# Patient Record
Sex: MALE | Race: BLACK OR AFRICAN AMERICAN | NOT HISPANIC OR LATINO | Employment: PART TIME | URBAN - METROPOLITAN AREA
[De-identification: names, ages, dates, MRNs, and addresses within clinical notes are randomized per-mention and may not be internally consistent; named-entity substitution may affect disease eponyms.]

---

## 2023-04-12 ENCOUNTER — OFFICE VISIT (OUTPATIENT)
Dept: PRIMARY CARE | Facility: CLINIC | Age: 23
End: 2023-04-12
Payer: COMMERCIAL

## 2023-04-12 VITALS
DIASTOLIC BLOOD PRESSURE: 76 MMHG | BODY MASS INDEX: 22.8 KG/M2 | SYSTOLIC BLOOD PRESSURE: 133 MMHG | WEIGHT: 168.3 LBS | OXYGEN SATURATION: 100 % | HEART RATE: 84 BPM | HEIGHT: 72 IN | TEMPERATURE: 98.1 F

## 2023-04-12 DIAGNOSIS — Z00.00 HEALTH CARE MAINTENANCE: Primary | ICD-10-CM

## 2023-04-12 PROCEDURE — 99203 OFFICE O/P NEW LOW 30 MIN: CPT

## 2023-04-12 PROCEDURE — 1036F TOBACCO NON-USER: CPT

## 2023-04-12 ASSESSMENT — PAIN SCALES - GENERAL: PAINLEVEL: 0-NO PAIN

## 2023-04-12 NOTE — PATIENT INSTRUCTIONS
It was so great to see you today Fraknlin Tao  . Today we discussed:    -blood drawn at [facility] lab  - See you in 1yr or sooner if needed    Call [phone]  For Care if you need to schedule appointment with specialist or images.    Follow up in       You were see by:    Dr. Grace Up D.O.  [facility]   Phone: [phone]

## 2023-04-12 NOTE — PROGRESS NOTES
"Subjective   Patient ID: Franklin Tao is a 22 y.o. male who presents for Annual Exam.    Concerns: none    PMH: none  PSH: none  Medications: none  Allergies: none  Fhx: Mother-Healthy Maternal Grandmother-health Father-unknown    SoHx:  -Living Situation: lives with mother and grandmother  -School/Employment: Works at [employer] part-time  -Substance: no T/A/RD use  -Abuse: denies  Hobbies: Video games    Preventative:  -Last AAA U/S: n/a  -Last Colonoscopy (50-75): n/a  -Last LDCT (55-80): n/a  -Last Dental: recommended follow up  -Last Eye exam: recommended follow up  -Last DEXA: n/a  -Exercise: Patient doesn't have any specific physical activity/regimen. He does walk/play with dog on regular basis. Has a pit bull. States he was jumping a trampoline today  -Diet: Endorses eating fruits & vegetables. Mostly home cooked meals by mother.  -Seat Belt Use: always    Immunizations:  -Flu vaccine: n/a  -Pneuomvax (PPSV23): n/a  -Prevnar (PCV13): not indicated  -HPV: need to request records  -Tdap: need to request records  -Shingrix(50+): not indicated    Screenings:  -Hep C (9863-1826): due (all adults >18yrs)  -HIV(15-65): due  -GC/CT: none  -Syphilis: none  -Lipid Panel (35M,45F): none  -DM screening: not indicated  -HTN screening: /76  -Vitamin D 25-OH: none  -Depression: PHQ-2 neg  -CAGE: neg  -PHQ-2: neg    REVIEW OF SYSTEMS:  -No fevers, chills, weight is stable  -No sores, ulcers, rashes, skin lesions  -No HA, SZ, syncope, stroke, TIA  -No CP, chest pressure  -No cough, SOB  -No ABD pain, nausea, vomiting  -No urinary urgency, dysuria, urinary frequency  -No BRBPR, melena, hematuria  -No bleeding  -No edema, no calf pain  -Organ systems reviewed & negative, except as mentioned in HPI            Review of Systems    Objective   /76 (BP Location: Left arm, Patient Position: Sitting, BP Cuff Size: Adult)   Pulse 84   Temp 36.7 °C (98.1 °F) (Temporal)   Ht 1.83 m (6' 0.05\")   Wt 76.3 kg (168 lb 4.8 oz)  "  SpO2 100%   BMI 22.80 kg/m²      PHYSICAL EXAM:  -General:  Well developed. Alert. No acute distress.  -Skin:  Warm, dry. normal skin turgor. no rashes. no lesions.  -HEENT:  NCAT. moist mucosa, no erythema. EOMI. PERRLA. Ear canal patent with TMs intact bilaterally. no abnormality on thyroid exam.   -Cardiovascular:  Regular rate and rhythm, normal S1 and S2, no gallops, no murmurs and no pericardial rub.  -Pulmonary:  Clear bilateral breath sounds. no crackles, rales, rhonchi. good chest rise B/L. speaks in full sentences.  -Abdomen:  (+) BS. soft. non-tender. non-distended. no abdominal masses. no guarding or rigidity.  -Neurologic:  CN 2-12 grossly intact. Sensation equal b/l and grossly intact.  -Musculoskeletal:  Normal gait. Normal Stance. full range of motion in all extremities. Strength 5/5 in all extremities.  -Psychiatric:  Good judgment. Good insight. Alert and oriented x3 (place, person, time). Normal recent memory. normal remote memory. Normal mood. Normal affect.  -: deferred    Assessment/Plan        22 year old M here for [facility]. patient overall doing well with no concerns. He wants to establish care with PCP and obtain a physical exam.     PLAN:  #RHM  -Nutrition: Limit junk food. Make sure you have enough calcium in your diet, and if not start a daily multivitamin.   -Exercise: Do some type of physical activity at least 30-60 minutes daily. ~  -Dental: recommend brushing at least twice daily, flossing daily, and visiting a dentist every 6 months.  -Safety: Always wear a seatbelt and avoid distractions while driving (ex. texting).   -Ordered HIV and HCV antibody screen  -Encouraged to download [facility]ney for [facility]      RTC in 1yr for Adult Well check or sooner if needed      Discussed with attending, Dr. Archie Up D.O.  Family Medicine, PGY 1

## 2023-04-18 NOTE — PROGRESS NOTES
I reviewed with the resident the medical history and the resident’s findings on physical examination.  I discussed with the resident the patient’s diagnosis and concur with the treatment plan as documented in the resident note.     Jayda Almanza MD

## 2024-03-12 ENCOUNTER — HOSPITAL ENCOUNTER (EMERGENCY)
Facility: HOSPITAL | Age: 24
Discharge: HOME | End: 2024-03-12

## 2024-03-12 VITALS
SYSTOLIC BLOOD PRESSURE: 116 MMHG | HEIGHT: 73 IN | BODY MASS INDEX: 23.86 KG/M2 | OXYGEN SATURATION: 97 % | RESPIRATION RATE: 18 BRPM | WEIGHT: 180 LBS | HEART RATE: 97 BPM | TEMPERATURE: 98.3 F | DIASTOLIC BLOOD PRESSURE: 68 MMHG

## 2024-03-12 DIAGNOSIS — J10.1 INFLUENZA B: Primary | ICD-10-CM

## 2024-03-12 LAB
FLUAV RNA RESP QL NAA+PROBE: NOT DETECTED
FLUBV RNA RESP QL NAA+PROBE: DETECTED
S PYO DNA THROAT QL NAA+PROBE: NOT DETECTED
SARS-COV-2 RNA RESP QL NAA+PROBE: NOT DETECTED

## 2024-03-12 PROCEDURE — 2500000001 HC RX 250 WO HCPCS SELF ADMINISTERED DRUGS (ALT 637 FOR MEDICARE OP): Performed by: PHYSICIAN ASSISTANT

## 2024-03-12 PROCEDURE — 87651 STREP A DNA AMP PROBE: CPT | Performed by: PHYSICIAN ASSISTANT

## 2024-03-12 PROCEDURE — 87636 SARSCOV2 & INF A&B AMP PRB: CPT | Performed by: EMERGENCY MEDICINE

## 2024-03-12 PROCEDURE — 99283 EMERGENCY DEPT VISIT LOW MDM: CPT

## 2024-03-12 RX ORDER — OSELTAMIVIR PHOSPHATE 75 MG/1
75 CAPSULE ORAL EVERY 12 HOURS
Qty: 10 CAPSULE | Refills: 0 | Status: SHIPPED | OUTPATIENT
Start: 2024-03-12 | End: 2024-03-17

## 2024-03-12 RX ORDER — IBUPROFEN 600 MG/1
600 TABLET ORAL ONCE
Status: COMPLETED | OUTPATIENT
Start: 2024-03-12 | End: 2024-03-12

## 2024-03-12 RX ADMIN — IBUPROFEN 600 MG: 600 TABLET, FILM COATED ORAL at 09:45

## 2024-03-12 ASSESSMENT — PAIN SCALES - GENERAL: PAINLEVEL_OUTOF10: 0 - NO PAIN

## 2024-03-12 ASSESSMENT — COLUMBIA-SUICIDE SEVERITY RATING SCALE - C-SSRS
1. IN THE PAST MONTH, HAVE YOU WISHED YOU WERE DEAD OR WISHED YOU COULD GO TO SLEEP AND NOT WAKE UP?: NO
2. HAVE YOU ACTUALLY HAD ANY THOUGHTS OF KILLING YOURSELF?: NO
6. HAVE YOU EVER DONE ANYTHING, STARTED TO DO ANYTHING, OR PREPARED TO DO ANYTHING TO END YOUR LIFE?: NO

## 2024-03-12 ASSESSMENT — PAIN - FUNCTIONAL ASSESSMENT: PAIN_FUNCTIONAL_ASSESSMENT: 0-10

## 2024-03-12 NOTE — ED PROVIDER NOTES
HPI   Chief Complaint   Patient presents with    Flu Symptoms       History of present illness: 23-year-old male complains of cough fever and chills for the past 3 days.  Says the family has recently been ill with positive influenza.  Cough is mostly unproductive.  Myalgias are moderate.  Denies any chest pain shortness of breath lightheadedness dizziness nausea vomiting diarrhea constipation dysuria polyuria.  Has not taken any medicine today.    Review of systems: Constitutional, eye, ENT, cardiovascular, respiratory, gastrointestinal, genitourinary, neurologic, musculoskeletal, dermatologic, hematologic, endocrine systems were evaluated and were negative unless otherwise specified in history of present illness.    Medications: Reviewed and per nursing note.    Family history: Denies relevant medical conditions.    Social history: Denies tobacco, alcohol, drug use.      Physical exam:    Appearance: Well-developed, well-nourished, nontoxic-appearing, alert and oriented x3. Talking in complete sentences.    HEENT:  Head normocephalic atraumatic, extraocular movements intact, pupils equal round reactive to light, mucous membranes are moist and pink.    NECK:  Nml Inspection, no meningismus, no thyromegaly, no lymphadenopathy, no JVD, trachea is midline.    Respiratory: Clear to auscultation bilaterally with normal bilateral excursion. No wheezes, rhonchi, crackles.    Cardiovascular: Regular rate and rhythm, no murmurs, rubs or gallops. Pulses 2+ symmetrically in the dorsalis pedis and radial pulses.    Abdomen/GI:  Soft, nontender, nondistended, normal bowel sounds x4. No masses or organomegaly.    :  No CVA tenderness    Neuro:  Oriented x 3, Speech Clear, cranial nerves grossly intact. Normal sensation to light touch in all 4 extremities.    Musculoskeletal: Patient spontaneously moves all 4 extremities.    Skin:  No cyanosis, clubbing, edema, open wounds, or rashes.                          Shelly Coma Scale  Score: 15                     Patient History   Past Medical History:   Diagnosis Date    Cardiac murmur, unspecified 01/12/2017    Murmur     Past Surgical History:   Procedure Laterality Date    OTHER SURGICAL HISTORY  10/15/2019    No history of surgery     No family history on file.  Social History     Tobacco Use    Smoking status: Never    Smokeless tobacco: Never   Substance Use Topics    Alcohol use: Never    Drug use: Never       Physical Exam   ED Triage Vitals [03/12/24 0915]   Temperature Heart Rate Respirations BP   (!) 38.3 °C (101 °F) (!) 132 16 124/81      Pulse Ox Temp src Heart Rate Source Patient Position   99 % -- -- --      BP Location FiO2 (%)     -- --       Physical Exam    ED Course & MDM   Diagnoses as of 03/12/24 1044   Influenza B       Medical Decision Making  Labs Reviewed  SARS-COV-2 AND INFLUENZA A/B PCR - Abnormal     Flu A Result                                         Flu B Result                  Detected (*)               Coronavirus 2019, PCR                                    Narrative: This assay has received FDA Emergency Use Authorization (EUA) and  is only authorized for the duration of time that circumstances exist to justify the authorization of the emergency use of in vitro diagnostic tests for the detection of SARS-CoV-2 virus and/or diagnosis of COVID-19 infection under section 564(b)(1) of the Act, 21 U.S.C. 360bbb-3(b)(1). Testing for SARS-CoV-2 is only recommended for patients who meet current clinical and/or epidemiological criteria as defined by federal, state, or local public health directives. This assay is an in vitro diagnostic nucleic acid amplification test for the qualitative detection of SARS-CoV-2, Influenza A, and Influenza B from nasopharyngeal specimens and has been validated for use at [facility]. Negative results do not preclude COVID-19 infections or Influenza A/B infections, and should not be used as the sole basis for  diagnosis, treatment, or other management decisions. If Influenza A/B and RSV PCR results are negative, testing for Parainfluenza virus, Adenovirus and Metapneumovirus is routinely performed for [facility] pediatric oncology and intensive care inpatients, and is available on other patients by placing an add-on request.   GROUP A STREPTOCOCCUS, PCR - Normal      Patient complains of cough and congestion sore throat.  Differential diagnosis of COVID-19, influenza, pneumonia, otitis media, tonsillitis, meningitis, sinusitis.  Examination shows lungs clear to auscultation, normal tympanic membranes, no meningeal signs, no sinus tenderness making pneumonia, otitis media, meningitis, sinusitis unlikely.      Strep Influenza COVID-19 swabs ordered.    Strep COVID-negative.  Positive influenza B.  Will treat for influenza B with oseltamavir.  No evidence of complication such as pneumonia at this time.    Patient will be discharged to home with prescription.  Patient is educated in signs and symptoms of worsening symptoms and reasons to come back to the emergency department.  Will need to follow up with primary care provider.  Patient does not report social determinants of health impacting ability to obtain care that is needed.  Patient agrees with plan.    This is a transcription.  Text was reviewed for errors, but some transcription errors may remain.  Please call for any questions.          Procedure  Procedures     Severino Agosto PA-C  03/12/24 1045

## 2024-03-12 NOTE — Clinical Note
Franklin Tao was seen and treated in our emergency department on 3/12/2024.  He may return to work on 03/18/2024.       If you have any questions or concerns, please don't hesitate to call.      Severino Agosto PA-C

## 2024-09-27 ENCOUNTER — APPOINTMENT (OUTPATIENT)
Dept: PRIMARY CARE | Facility: CLINIC | Age: 24
End: 2024-09-27
Payer: COMMERCIAL

## 2024-09-27 ENCOUNTER — LAB (OUTPATIENT)
Dept: LAB | Facility: LAB | Age: 24
End: 2024-09-27
Payer: COMMERCIAL

## 2024-09-27 VITALS
TEMPERATURE: 97.8 F | BODY MASS INDEX: 24.77 KG/M2 | WEIGHT: 186.9 LBS | HEART RATE: 67 BPM | DIASTOLIC BLOOD PRESSURE: 80 MMHG | OXYGEN SATURATION: 98 % | HEIGHT: 73 IN | RESPIRATION RATE: 16 BRPM | SYSTOLIC BLOOD PRESSURE: 125 MMHG

## 2024-09-27 DIAGNOSIS — Z00.00 HEALTH CARE MAINTENANCE: Primary | ICD-10-CM

## 2024-09-27 DIAGNOSIS — Z00.00 HEALTH CARE MAINTENANCE: ICD-10-CM

## 2024-09-27 DIAGNOSIS — Z28.21 INFLUENZA VACCINATION DECLINED: ICD-10-CM

## 2024-09-27 DIAGNOSIS — Z11.3 SCREENING EXAMINATION FOR STD (SEXUALLY TRANSMITTED DISEASE): ICD-10-CM

## 2024-09-27 LAB
ALBUMIN SERPL BCP-MCNC: 4.6 G/DL (ref 3.4–5)
ALP SERPL-CCNC: 69 U/L (ref 33–120)
ALT SERPL W P-5'-P-CCNC: 22 U/L (ref 10–52)
ANION GAP SERPL CALC-SCNC: 13 MMOL/L (ref 10–20)
AST SERPL W P-5'-P-CCNC: 20 U/L (ref 9–39)
BILIRUB SERPL-MCNC: 0.6 MG/DL (ref 0–1.2)
BUN SERPL-MCNC: 11 MG/DL (ref 6–23)
C TRACH RRNA SPEC QL NAA+PROBE: NEGATIVE
CALCIUM SERPL-MCNC: 10.1 MG/DL (ref 8.6–10.6)
CHLORIDE SERPL-SCNC: 104 MMOL/L (ref 98–107)
CO2 SERPL-SCNC: 27 MMOL/L (ref 21–32)
CREAT SERPL-MCNC: 0.89 MG/DL (ref 0.5–1.3)
EGFRCR SERPLBLD CKD-EPI 2021: >90 ML/MIN/1.73M*2
EST. AVERAGE GLUCOSE BLD GHB EST-MCNC: 108 MG/DL
GLUCOSE SERPL-MCNC: 82 MG/DL (ref 74–99)
HBA1C MFR BLD: 5.4 %
N GONORRHOEA DNA SPEC QL PROBE+SIG AMP: NEGATIVE
POTASSIUM SERPL-SCNC: 4.5 MMOL/L (ref 3.5–5.3)
PROT SERPL-MCNC: 7.4 G/DL (ref 6.4–8.2)
SODIUM SERPL-SCNC: 139 MMOL/L (ref 136–145)

## 2024-09-27 PROCEDURE — 99395 PREV VISIT EST AGE 18-39: CPT

## 2024-09-27 PROCEDURE — 1036F TOBACCO NON-USER: CPT

## 2024-09-27 PROCEDURE — 36415 COLL VENOUS BLD VENIPUNCTURE: CPT

## 2024-09-27 PROCEDURE — 3008F BODY MASS INDEX DOCD: CPT

## 2024-09-27 ASSESSMENT — ENCOUNTER SYMPTOMS
OCCASIONAL FEELINGS OF UNSTEADINESS: 0
DEPRESSION: 0
LOSS OF SENSATION IN FEET: 0

## 2024-09-27 ASSESSMENT — COLUMBIA-SUICIDE SEVERITY RATING SCALE - C-SSRS
1. IN THE PAST MONTH, HAVE YOU WISHED YOU WERE DEAD OR WISHED YOU COULD GO TO SLEEP AND NOT WAKE UP?: NO
6. HAVE YOU EVER DONE ANYTHING, STARTED TO DO ANYTHING, OR PREPARED TO DO ANYTHING TO END YOUR LIFE?: NO
2. HAVE YOU ACTUALLY HAD ANY THOUGHTS OF KILLING YOURSELF?: NO

## 2024-09-27 ASSESSMENT — PATIENT HEALTH QUESTIONNAIRE - PHQ9
1. LITTLE INTEREST OR PLEASURE IN DOING THINGS: NOT AT ALL
SUM OF ALL RESPONSES TO PHQ9 QUESTIONS 1 AND 2: 0
2. FEELING DOWN, DEPRESSED OR HOPELESS: NOT AT ALL

## 2024-09-27 ASSESSMENT — PAIN SCALES - GENERAL: PAINLEVEL: 6

## 2024-09-27 NOTE — PROGRESS NOTES
"Subjective   Patient ID: Franklin Tao \"Franklin Tao\" is a 24 y.o. 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  HENT:      Head: Normocephalic and atraumatic.      Right Ear: Tympanic membrane normal.      Left Ear: Tympanic membrane normal.      Nose: Nose normal.      Mouth/Throat:      Mouth: Mucous membranes are moist.   Eyes:      Conjunctiva/sclera: Conjunctivae normal.   Cardiovascular:      Rate and Rhythm: Normal rate and regular rhythm.      Heart sounds: No murmur heard.  Pulmonary:      Effort: Pulmonary effort is normal.      Breath sounds: No wheezing, rhonchi or rales.   Musculoskeletal:         General: Normal range of motion.      Cervical back: Normal range of motion.      Right lower leg: No edema.      Left lower leg: No edema.   Skin:     General: Skin is warm and dry.   Neurological:      General: No focal deficit present.      Mental Status: He is alert.   Psychiatric:         Mood and Affect: Mood normal.         Behavior: Behavior normal.         Assessment/Plan     Franklin Tao \"Franklin Tao\" is " a 24 y.o. male with no significant past medical history who presents for Follow-up.     Diagnoses and all orders for this visit:  Health care maintenance  -     Lipid panel; Future  -     Hemoglobin A1c; Future  -     Comprehensive metabolic panel; Future  -     Discussed balanced diet and regular physical activity  Screening examination for STD (sexually transmitted disease)  -     C. trachomatis / N. gonorrhoeae, Amplified; Future  -      Discussed sexual health topics  Influenza vaccination declined      RTC in 1 yr or earlier if needed    Discussed with Dr. Clint Up,   PGY3    This note was completed using Dragon voice recognition technology and may include unintended errors with respect to translation of words, typographical errors or grammar errors which may not have been identified while finalizing the chart.